# Patient Record
Sex: MALE | Race: WHITE | Employment: FULL TIME | URBAN - METROPOLITAN AREA
[De-identification: names, ages, dates, MRNs, and addresses within clinical notes are randomized per-mention and may not be internally consistent; named-entity substitution may affect disease eponyms.]

---

## 2021-02-16 ENCOUNTER — VIRTUAL VISIT (OUTPATIENT)
Dept: ENDOCRINOLOGY | Age: 26
End: 2021-02-16
Payer: COMMERCIAL

## 2021-02-16 DIAGNOSIS — E10.65 HYPERGLYCEMIA DUE TO TYPE 1 DIABETES MELLITUS (HCC): Primary | ICD-10-CM

## 2021-02-16 PROCEDURE — 99204 OFFICE O/P NEW MOD 45 MIN: CPT | Performed by: INTERNAL MEDICINE

## 2021-02-16 RX ORDER — BLOOD-GLUCOSE SENSOR
EACH MISCELLANEOUS
Qty: 3 DEVICE | Refills: 11 | Status: SHIPPED | OUTPATIENT
Start: 2021-02-16 | End: 2021-03-23 | Stop reason: SDUPTHER

## 2021-02-16 RX ORDER — INSULIN ASPART 100 [IU]/ML
INJECTION, SOLUTION INTRAVENOUS; SUBCUTANEOUS
Qty: 10 ML | Refills: 5 | Status: SHIPPED | OUTPATIENT
Start: 2021-02-16 | End: 2022-05-04 | Stop reason: SDUPTHER

## 2021-02-16 RX ORDER — INSULIN GLARGINE 100 [IU]/ML
INJECTION, SOLUTION SUBCUTANEOUS
Qty: 15 ML | Refills: 3 | Status: SHIPPED | OUTPATIENT
Start: 2021-02-16 | End: 2022-05-04 | Stop reason: SDUPTHER

## 2021-02-16 RX ORDER — INSULIN ASPART 100 [IU]/ML
INJECTION, SOLUTION INTRAVENOUS; SUBCUTANEOUS
COMMUNITY
End: 2021-02-16 | Stop reason: SDUPTHER

## 2021-02-16 RX ORDER — BLOOD-GLUCOSE,RECEIVER,CONT
EACH MISCELLANEOUS
Qty: 1 EACH | Refills: 0 | Status: SHIPPED | OUTPATIENT
Start: 2021-02-16 | End: 2021-03-23 | Stop reason: SDUPTHER

## 2021-02-16 RX ORDER — BLOOD SUGAR DIAGNOSTIC
STRIP MISCELLANEOUS
COMMUNITY
Start: 2021-02-13 | End: 2021-02-16 | Stop reason: SDUPTHER

## 2021-02-16 RX ORDER — INSULIN GLARGINE 100 [IU]/ML
24 INJECTION, SOLUTION SUBCUTANEOUS
Qty: 1 VIAL | Status: CANCELLED | OUTPATIENT
Start: 2021-02-16

## 2021-02-16 RX ORDER — BLOOD-GLUCOSE TRANSMITTER
1 EACH MISCELLANEOUS
Qty: 3 DEVICE | Refills: 11 | Status: SHIPPED | OUTPATIENT
Start: 2021-02-16 | End: 2021-03-23 | Stop reason: SDUPTHER

## 2021-02-16 RX ORDER — INSULIN GLARGINE 100 [IU]/ML
24 INJECTION, SOLUTION SUBCUTANEOUS AT BEDTIME
COMMUNITY
End: 2021-02-16 | Stop reason: SDUPTHER

## 2021-02-16 RX ORDER — BLOOD SUGAR DIAGNOSTIC
STRIP MISCELLANEOUS
Qty: 250 STRIP | Refills: 5 | Status: SHIPPED | OUTPATIENT
Start: 2021-02-16 | End: 2022-05-18 | Stop reason: SDUPTHER

## 2021-02-16 RX ORDER — INSULIN ASPART 100 [IU]/ML
INJECTION, SOLUTION INTRAVENOUS; SUBCUTANEOUS
Qty: 15 ML | Refills: 4 | Status: SHIPPED | OUTPATIENT
Start: 2021-02-16 | End: 2022-05-18 | Stop reason: SDUPTHER

## 2021-02-16 NOTE — PROGRESS NOTES
Chief Complaint   Patient presents with    New Patient     doxy: [phone]    Diabetes    Other     [facility]       History of Present Illness: Ana M Lala is a 31 y.o. male with minimal past medical hx presenting in self referral for discussion related to medication management of DM1. 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 Has been off cgm since October. Has the same pump he has had since 2016, has not used it in many months. Is using basal bolus currently but finds that BG is labile with this, particularly following exercise    Previous tandem t-slim pump settings:  1.1U/hr- maybe reduced overnight   1:10  1U 40 >150    Using humalog flexpens, prefers novolog. 24 of basaglar currently.      Diabetes Mellitus Type     * Diagnosed (year): 03/2013 before turning 18   * Last Hb A1C: 8.4 most recently- increased from 7.5%- typically in the 8 range since starting school     - Current DM medications:    - Orals:    - Injectables: 24U basaglar, humalog SF 40 1:10 insulin to carb ratio     - Monitors glucose: 8 times a day   Examples (range):    - fasting:  mg/dL lower in the AM minimally 58   - pre-lunch: mg/dL higher 2-3 hours after breakfast    - pre-dinner:  mg/dL after lunch spike   - post-prandial:  mg/dL 483     - History of hypoglycemia: if he does it's early in the AM around 5004-4057 3-4x weekly    - Hospitalized for DM: none    Diabetes-related complications:    * Nephropathy: Microalbuminuria 2016   * Retinopathy:    * Neuropathy: none   * Autonomic dysfunction: none   * History of amputations or foot ulcers: none     Lifestyle:    24-hour diet history:    meals/day snacks/day   * Breakfast: Protein waffle with pb and protein shake    * Lunch: Salad with chicken and broccoli, apple, protein bar and turkey   * Dinner: chipoltle -bowl usually low carb   * Snacks:     * Desserts:    * Drinks: h20 or seltzer    2019QI  Exercise:  Goes to the gym at 0500- does crossfit workouts and strength - weightlifting- runs when it's nice out 5-6x weekly     Support and Resources:   - Visit with dietician in the last 2 years: has not    Past Medical History:   Diagnosis Date    DM (diabetes mellitus), type 1 ([facility] [address]        History reviewed. No pertinent surgical history. Current Outpatient Medications   Medication Sig    OneTouch Verio test strips strip     insulin aspart U-100 (NOVOLOG) 100 unit/mL injection by SubCUTAneous route.  insulin glargine (LANTUS) 100 unit/mL injection 24 Units by SubCUTAneous route At bedtime. Indications: type 1 diabetes mellitus    insulin aspart U-100 (NovoLOG Flexpen U-100 Insulin) 100 unit/mL (3 mL) inpn by SubCUTAneous route Before breakfast, lunch, and dinner. 1 unit for every 10 carbs  1 unit for every 40 point over 150. No current facility-administered medications for this visit.         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     Review of Systems:  - Constitutional Symptoms: no fevers, chills, weight loss  - Eyes: no blurry vision or double vision  - Cardiovascular: no chest pain or palpitations  - Respiratory: no cough or shortness of breath  - Gastrointestinal: no dysphagia or abdominal pain  - Musculoskeletal: no joint pains or weakness  - Integumentary: no rashes  - Psychiatric: no depression or anxiety  - Endocrine: no heat or cold intolerance, no polyuria or polydipsia    Physical Examination:  - GENERAL: NCAT, Appears well nourished   - EYES: EOMI, non-icteric, no proptosis   - Ear/Nose/Throat: NCAT, no visible inflammation or masses   - CARDIOVASCULAR: no cyanosis, no visible JVD   - RESPIRATORY: respiratory effort normal without any distress or labored breathing   - MUSCULOSKELETAL: Normal ROM of neck and upper extremities observed   - SKIN: No rash on face  - NEUROLOGIC:  No facial asymmetry (Cranial nerve 7 motor function), No gaze palsy   - PSYCHIATRIC: Normal affect, Normal insight and judgement       Data Reviewed: none available at this time      Assessment/Plan: 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 He does have a history of microalbuminuria. We discussed the benefits of the t-slim x2 control IQ settings, will move towards upgrading pump. Fasting hypOglycemia suggests a variable basal rate. 1. DM1 c/b nephropathy previously  -continue 24U lantus, I:C of 1:10, SF 40 for now  - MICROALBUMIN, UR, RAND W/ MICROALB/CREAT RATIO; Future  - METABOLIC PANEL, BASIC; Future  - LIPID PANEL; Future  - Dexcom G6  misc; Use as directed  Dispense: 1 Each; Refill: 0  - Dexcom G6 Sensor rodger; Use as directed every 10 days  Dispense: 3 Device; Refill: 11  - Dexcom G6 Transmitter rodger; 1 Each by Other route every ten (10) days. Use as directed  Dispense: 3 Device; Refill: 11  - HEMOGLOBIN A1C WITH EAG; Future    Return to care:March 23rd at 3:10      Michael Perez is a 31 y.o. male being evaluated by a Virtual Visit (video visit) encounter to address concerns as mentioned above. A caregiver was present when appropriate. Due to this being a TeleHealth encounter (During EAZXK-21 public health emergency), evaluation of the following organ systems was limited:     Vitals/Constitutional/EENT/Resp/CV/GI//MS/Neuro/Skin/Heme-Lymph-Imm. Pursuant to the emergency declaration under the [facility], [address] [place] [address] [facility] authority and the [employer] and [employer] Act, this Virtual Visit was conducted with patient's (and/or legal guardian's) consent, to reduce the risk of exposure to COVID-19 and provide necessary medical care.       Services were provided through a video synchronous discussion virtually to substitute for in-person encounter. Jesse Tesfaye MD  [facility]     An electronic signature was used to authenticate this note.

## 2021-03-19 ENCOUNTER — TELEPHONE (OUTPATIENT)
Dept: ENDOCRINOLOGY | Age: 26
End: 2021-03-19

## 2021-03-19 NOTE — TELEPHONE ENCOUNTER
----- Message from [address] [place] [address] sent at 3/18/2021  4:17 PM EDT -----  Regarding: Dr. Zapata Links telephone  Patient return call    Caller's first and last name and relationship (if not the patient):      Best contact number(s):[phone]      Whose call is being returned: nurse      Details to clarify the request: Stated his insulin and supplies need approved as soon as possible to be filled by [facility] as he is very low on all of his supplies and would like to be contacted tomorrow.       [address] [place] [address]

## 2021-03-19 NOTE — TELEPHONE ENCOUNTER
Called and left a message that we have not yet received an order from marc st[facility]. Pt was then given his provider's first and last name along with our fax number and requested to have [facility] to send the request to the above number. James Rivera

## 2021-03-23 ENCOUNTER — VIRTUAL VISIT (OUTPATIENT)
Dept: ENDOCRINOLOGY | Age: 26
End: 2021-03-23
Payer: COMMERCIAL

## 2021-03-23 DIAGNOSIS — E10.65 HYPERGLYCEMIA DUE TO TYPE 1 DIABETES MELLITUS (HCC): ICD-10-CM

## 2021-03-23 PROCEDURE — 99212 OFFICE O/P EST SF 10 MIN: CPT | Performed by: INTERNAL MEDICINE

## 2021-03-23 RX ORDER — BLOOD-GLUCOSE SENSOR
EACH MISCELLANEOUS
Qty: 3 DEVICE | Refills: 11 | Status: SHIPPED | OUTPATIENT
Start: 2021-03-23

## 2021-03-23 RX ORDER — BLOOD-GLUCOSE,RECEIVER,CONT
EACH MISCELLANEOUS
Qty: 1 EACH | Refills: 0 | Status: SHIPPED | OUTPATIENT
Start: 2021-03-23

## 2021-03-23 RX ORDER — BLOOD-GLUCOSE TRANSMITTER
1 EACH MISCELLANEOUS
Qty: 3 DEVICE | Refills: 11 | Status: SHIPPED | OUTPATIENT
Start: 2021-03-23

## 2021-03-23 NOTE — PROGRESS NOTES
Chief Complaint   Patient presents with    Diabetes     ara [email or web address]    Follow-up       History of Present Illness: Maggi Aleman is a 31 y.o. male with minimal past medical hx presenting in self referral for discussion related to medication management of DM1.    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 Has been off cgm since October. Has the same pump he has had since , has not used it in many months. Is using basal bolus currently but finds that BG is labile with this, particularly following exercise    Previous tandem t-slim pump settings:  1.1U/hr- maybe reduced overnight   1:10  1U 40 >150    Using humalog flexpens, prefers novolog. 24 of basaglar currently. 2021: Reached out to tandem- everything was fine on their end- reached out to [employer]- they took too long, rx . Needs new rx. Generally, BG is stable. Uses lantus before gym. Can go to bed at 200 and wake up low.      Diabetes Mellitus Type     * Diagnosed (year): 2013 before turning 18   * Last Hb A1C: 8.4 most recently- increased from 7.5%- typically in the 8 range since starting school     - Current DM medications:    - Orals:    - Injectables: 24U basaglar, humalog SF 40 1:10 insulin to carb ratio     - Monitors glucose: 8 times a day   Examples (range):    - fasting:  mg/dL lower in the AM minimally 58   - pre-lunch: mg/dL higher 2-3 hours after breakfast    - pre-dinner:  mg/dL after lunch spike   - post-prandial:  mg/dL 483 max    - History of hypoglycemia: if he does it's early in the AM around 3555-9937 3-4x weekly    - Hospitalized for DM: none    Diabetes-related complications:    * Nephropathy: Microalbuminuria 2016   * Retinopathy: Referral placed   * Neuropathy: none   * Autonomic dysfunction: none   * History of amputations or foot ulcers: none     Lifestyle:    24-hour diet history:    meals/day snacks/day   * Breakfast: Protein waffle with pb and protein shake    * Lunch: Salad with chicken and broccoli, apple, protein bar and turkey   * Dinner: chipoltle -bowl usually low carb   * Snacks:     * Desserts:    * Drinks: h20 or seltzer    2019QI  Exercise:  Goes to the gym at 0500- does crossfit workouts and strength - weightlifting- runs when it's nice out 5-6x weekly     Support and Resources:   - Visit with dietician in the last 2 years: has not    Current Outpatient Medications   Medication Sig    OneTouch Verio test strips strip Use to check blood glucose 8x daily. Dx code e10.9    insulin glargine (LANTUS,BASAGLAR) 100 unit/mL (3 mL) inpn Inject 24 units subcutaneously daily when not using insulin pump  Indications: type 1 diabetes mellitus    insulin aspart U-100 (NovoLOG U-100 Insulin aspart) 100 unit/mL injection Use with insulin pump. Maximum daily dose 80 units. Indications: type 1 diabetes mellitus    insulin aspart U-100 (NovoLOG Flexpen U-100 Insulin) 100 unit/mL (3 mL) inpn 1 unit for every 10 carbs  1 unit for every 40 point over 150. Max daily dose 55 units. Indications: type 1 diabetes mellitus    Dexcom G6  misc Use as directed    Dexcom G6 Sensor rodger Use as directed every 10 days    Dexcom G6 Transmitter rodger 1 Each by Other route every ten (10) days. Use as directed     No current facility-administered medications for this visit.         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     Review of Systems:  - Constitutional Symptoms: no fevers, chills, weight loss  - Eyes: no blurry vision or double vision  - Cardiovascular: no chest pain or palpitations  - Respiratory: no cough or shortness of breath  - Gastrointestinal: no dysphagia or abdominal pain  - Musculoskeletal: no joint pains or weakness  - Integumentary: no rashes  - Psychiatric: no depression or anxiety  - Endocrine: no heat or cold intolerance, no polyuria or polydipsia    Physical Examination:  - GENERAL: NCAT, Appears well nourished   - EYES: EOMI, non-icteric, no proptosis   - Ear/Nose/Throat: NCAT, no visible inflammation or masses   - CARDIOVASCULAR: no cyanosis, no visible JVD   - RESPIRATORY: respiratory effort normal without any distress or labored breathing   - MUSCULOSKELETAL: Normal ROM of neck and upper extremities observed   - SKIN: No rash on face  - NEUROLOGIC:  No facial asymmetry (Cranial nerve 7 motor function), No gaze palsy   - PSYCHIATRIC: Normal affect, Normal insight and judgement       Data Reviewed: pending      Assessment/Plan: 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 He does have a history of microalbuminuria. We discussed the benefits of the t-slim x2 control IQ settings, will move towards upgrading pump. Fasting hypOglycemia suggests a variable basal rate. 1. DM1 c/b nephropathy previously  -continue 24U lantus, I:C of 1:10, SF 40 for now  - MICROALBUMIN, UR, RAND W/ MICROALB/CREAT RATIO; Future  - METABOLIC PANEL, BASIC; Future  - LIPID PANEL; Future  - Dexcom G6  misc; Use as directed  Dispense: 1 Each; Refill: 0  - Dexcom G6 Sensor rodger; Use as directed every 10 days  Dispense: 3 Device; Refill: 11  - Dexcom G6 Transmitter rodger; 1 Each by Other route every ten (10) days. Use as directed  Dispense: 3 Device; Refill: 11  - HEMOGLOBIN A1C WITH EAG; Future  - REFERRAL TO DIABETIC EDUCATION; Standing  - REFERRAL TO OPHTHALMOLOGY    Return to care following pump start. Robert Alas is a 31 y.o. male being evaluated by a Virtual Visit (video visit) encounter to address concerns as mentioned above. A caregiver was present when appropriate.  Due to this being a TeleHealth encounter (During IFZSP-85 public health emergency), evaluation of the following organ systems was limited:     Vitals/Constitutional/EENT/Resp/CV/GI//MS/Neuro/Skin/Heme-Lymph-Imm. Pursuant to the emergency declaration under the [address] [place] [address], [address] [place] [address] authority and the [employer] and [employer] Act, this Virtual Visit was conducted with patient's (and/or legal guardian's) consent, to reduce the risk of exposure to COVID-19 and provide necessary medical care. Services were provided through a video synchronous discussion virtually to substitute for in-person encounter. Lyudmila Gill MD  [facility]     An electronic signature was used to authenticate this note.

## 2021-03-24 NOTE — TELEPHONE ENCOUNTER
----- Message from [place] Matos sent at 3/24/2021  8:32 AM EDT -----  Regarding: Dr Alie Bowman Message/Vendor Calls    Caller's first and last name: Rey Grimes with [facility]      Reason for call: Rey Grimes would like to verify whether Dr Edmond Hamilton will be signing off on the medical request for pts infusions      Callback required yes/no and why: yes, to verify whether Dr Edmond Hamilton will be signing off on the medical request for pts infusions      Best contact number(s): [phone] reference #[id number]      Details to clarify the request: Rey Grimes would like to verify whether Dr Edmond Hamilton will be signing off on the medical request for pts infusions      Cuba Matos

## 2021-03-24 NOTE — TELEPHONE ENCOUNTER
Spoke with Timur Marvin at [facility] to make her aware the CGM orders were received and will be faxed back before the end of the day.

## 2021-04-13 ENCOUNTER — VIRTUAL VISIT (OUTPATIENT)
Dept: DIABETES SERVICES | Age: 26
End: 2021-04-13
Payer: COMMERCIAL

## 2021-04-13 DIAGNOSIS — E10.65 HYPERGLYCEMIA DUE TO TYPE 1 DIABETES MELLITUS (HCC): ICD-10-CM

## 2021-04-13 PROCEDURE — G0108 DIAB MANAGE TRN  PER INDIV: HCPCS

## 2021-04-13 NOTE — PROGRESS NOTES
[employer] [facility]  Diabetes Self-Management Education & Support Program  Encounter note    SUMMARY  Diabetes self-care management training was completed related to Monitoring, Physical activity, Taking medications and Problem solving. The participant will return on May 25, 2021 to continue DSMES related to insulin pump therapy and training. EVALUATION:  Discussed and addressed patient's questions regarding his pending shipment of his Tandem t:slim x2 system. He reports jaquan  RECOMMENDATIONS:  1) start your Dexcom G6 sensor prior to your pump start and be in active session. 2) will address setting midday basal adjustment per your experiences. 3) advised to start reviewing carb counting and meals that you are having problems estimating carbs for review with self at f/u visit. Considerations could be non starchy vegetables and adjusting for dietary fiber. May also consider dosing for protein if significant. May needs bolus extension. 4) discussed decreases to hypoglycemia treatment in the future with Control IQ will be needed to avoid system corrections. 5) will address nutrition and carb counting post pump start. Currently due to Jose A's schedule, pump training is scheduled for May 25th, 2021. DATE DSMES TOPIC EVALUATION     4/13/2021 HOW CAN BLOOD GLUCOSE MONITORING HELP ME?   a. Value of blood glucose monitoring   b. Realistic expectations   c. Differences between sensor and blood glucose values. d. Setting alerts on sensor for high/low/out of range  e. Using sensor glucose levels for treatment decisions. f. Using glucometer for treatment decisions   g. Use of sensor trend arrows when dosing insulin   h. Sensor sites on body and relative to infusion sets  i. Tips for improving adhesion    j. Downloading and using smartphone apps  k.  Data sharing with provider        Eros Momin is knowledgeable with regards to Dexcom G6 - we did discuss using the trend arrows while on injections to consider predictive dosing. He is currently self monitoring using glucometer until his supplies are shipped to restart his CGM. Advised him that when ready to start his pump to have an active sensor session is best.  Encouraged having Dexcom G6 and Clarity apps downloaded to smartphone. DATE DSMES TOPIC EVALUATION     4/13/2021 HOW DO MY DIABETES MEDICATIONS WORK?   a. Type 1 medications & methods    Insulin injections   c. Hypoglycemia symptoms & treatment    Glucagon emergency kits   d. General guidance regarding insulin use whether Type 1, 2 or gestational diabetes    Storage of insulin    Disposal     Traveling with medications   e. Barriers to medication adherence      The participant    Can describe the expected action & side effects of prescribed diabetes medications Yes.  Can demonstrate injection technique (if applicable) Yes. Gabi Mittal shared that he has used t:slimx2- familiar with system, reservoir changes and infusion sets. Has only been in injections for ~2-3 months and awaiting delivery of new system. Reviewed Control IQ - target, correction bolus and basal adjustments, insulin duration changes and 60% delivered from bolus calculator. DATE DSMES TOPIC EVALUATION     4/13/2021 HOW DOES PHYSICAL ACTIVITY AFFECT MY DIABETES?   a. Benefits of physical activity   b. Beginning a program of physical activity    Walking    Pedometers    Goal setting   c. Structured physical activity program    Aerobic activity    Resistance    Flexibility    Balance   d. Physical activity program progression   e. Safety issues   f. Barriers to physical activity   g. Facilitators of physical activity    Gabi Mittal shared that he is exercising usually for 60 minutes at anytime: gym/strength training, crossfit or soccer. Shared that for no reason BG values after activity could be upper 100-low 200 mg/dl but as high as 300 mg/dl after soccer.   We discussed how and why - reviewed Exercise activity with the new tandem system along with algorithm target 150 mg/dl and will have to tailor duration s/p activity to return to Control IQ. Discussed options for helping sensor adhesion with exercise for 10 day wear. Chandana ROMEO on 4/13/2021 at 3:27 PM    SYSFE-26 Public Health Emergency Adaptations for Telehealth:    Minerva Negron is a 31 y.o. male being evaluated through a synchronous (real-time) audio-video technology platform, as a substitution for an in-person encounter, to address the healthcare issues mentioned above. I was in the office. The patient was at home. A caregiver was present when appropriate. The patient and/or his healthcare decision maker, is aware that this patient-initiated, Telehealth encounter on 4/13/2021 is a billable service, with coverage as determined by his insurance carrier. He is aware that he may receive a bill and has provided verbal consent to proceed: Yes. This telehealth encounter occurred during the COVID-19 pandemic and public health emergency. Evaluation of the following organ systems was limited: Vitals/Constitutional/EENT/Resp/CV/GI//MS/Neuro/Skin/Heme-Lymph-Imm. Pursuant to the emergency declaration under the [facility], [address] [place] [address] [facility] authority and the [employer] and [employer] Act, this Virtual Visit was conducted with patient's (and/or legal guardian's) consent, to reduce the risk of exposure to COVID-19 and provide necessary medical care.      Time in appointment: 60 minutes

## 2021-05-25 ENCOUNTER — OFFICE VISIT (OUTPATIENT)
Dept: DIABETES SERVICES | Age: 26
End: 2021-05-25
Payer: COMMERCIAL

## 2021-05-25 DIAGNOSIS — E10.65 HYPERGLYCEMIA DUE TO TYPE 1 DIABETES MELLITUS (HCC): Primary | ICD-10-CM

## 2021-05-25 PROCEDURE — G0108 DIAB MANAGE TRN  PER INDIV: HCPCS | Performed by: DIETITIAN, REGISTERED

## 2021-05-25 NOTE — PROGRESS NOTES
[address] [place] [address] for Diabetes Health  Diabetes Self-Management Education & Support Program  Encounter note    SUMMARY  Diabetes self-care management training was completed related to insulin pump training. The participant will return on June 22 to continue DSMES related to problem solving and reviewing insulin pump data. EVALUATION:  Berry Logan is well versed in the basics of the Tandem t:slimx2 pump - he is new sensor integration along with mobile applications. Completed set change with me today - would consider updating fill amount once TDD has been establish with Control IQ working. Following systems were working on his system: Dexcom sensor paired with pump and smart phone loren, Bluetooth connection with t:connect loren, Control IQ on and active. Berry Logan has very good and insightful questions about his system, discussed target BG ranges with different activity programs. We did program sleep schedule for Sunday-Thursday with Friday and Saturday as unscheduled for him to program.     RECOMMENDATIONS:  1) consider using the bolus extension feature on your bolus menu for high fat, protein meals. 2) program all carbs into the insulin pump. 3) with low BG values, review your SG trend arrow first to assess if treatment is working as this may change direction before your SG value increases and remember to check for the control IQ icon to turn red and red zero status icon. 4) set Exercise activity 30 minutes prior and may need to run 30 minutes post activity. 5) pay attention with ETOH consumption - Control IQ may need to run without sleep activity? Could be trial and error or may want to turn off control IQ. 6) suspend \"stop insulin\" if you are disconnecting from your pump to maintain most accurate IOB estimation. Next provider visit is scheduled for pending. DATE DSMES TOPIC EVALUATION     5/25/2021 HOW CAN BLOOD GLUCOSE MONITORING HELP ME?   a. Value of blood glucose monitoring   b.  Realistic expectations   c. Differences between sensor and blood glucose values. d. Setting alerts on sensor for high/low/out of range  e. Using sensor glucose levels for treatment decisions. f. Using glucometer for treatment decisions   g. Use of sensor trend arrows when dosing insulin   h. Sensor sites on body and relative to infusion sets  i. Tips for improving adhesion    j. Downloading and using smartphone apps  k. Data sharing with provider        The participant    Can demonstrate their sensor/glucometer procedure Yes   Logs their BG readings Yes - has Dexcom and t:connect mobile apps     The participant and I reviewed sensor updates, trend arrows and times to SB with meter for making treatment decisions. Discussed how to use Skin Tac if needed for adhesion w/ activity and consider using overtape only if adhesive pad edge has started to lift. Reviewed barrier options if needed due to reaction. DATE DSMES TOPIC EVALUATION     5/25/2021 HOW DO MY DIABETES MEDICATIONS WORK?   a. Type 1 medications & methods    Insulin injections    Injection sites   b. Type 2 medications    Oral agents    GLP-1 agonists   c. Hypoglycemia symptoms & treatment    Glucagon emergency kits   d. General guidance regarding insulin use whether Type 1, 2 or gestational diabetes    Storage of insulin    Disposal     Traveling with medications   e. Barriers to medication adherence    Pt completed insulin pump training per Tandem guidelines for their t:slimx2 with Control IQ . Pt has been using the t:slim pump x2 pump without basal or control IQ functions. Pt is using [employer]  infusion set with 6mm cannula with a fill amount:  0.3 units.  Settings program today are as follows for personal profiles:    Timed Settings Basal   unit/hour Correction Factor   1 unit: mg/dl Carb Ratio   1 unit: gram Target BG  1 unit: mg/dl   12:00 am 1.0  40 mg/dl  12 g  150 mg/dl   12:00 pm 1.1  40 mg/dl     12 g  150 mg/dl    3:00 pm 1.0  40 mg/dl 12 g   150 mg/dl   6:00 pm 1.1  40 mg/dl     12 g  150 mg/dl     CGM Settings are as follows:  High Alert/ Repeat Alert: 200 mg/dl with 2 hours repeat  Low Alert/Repeat Alert: 80 mg/dl with 15 minute repeat  Max basal: 3 units/hour, Max bolus: 25 units. Currently, we have no data for establishing if any of his recent settings need changes. He has transcribe these from his older pump and this was used to develop his injection regimen. Miri Garcia RD on 5/25/2021 at 2:05 PM    Pt was seen in person for pump upgrade today.   Time in appointment: 90 minutes

## 2022-05-04 RX ORDER — INSULIN ASPART 100 [IU]/ML
INJECTION, SOLUTION INTRAVENOUS; SUBCUTANEOUS
Qty: 10 ML | Refills: 0 | Status: SHIPPED | OUTPATIENT
Start: 2022-05-04 | End: 2022-05-18 | Stop reason: SDUPTHER

## 2022-05-04 RX ORDER — INSULIN GLARGINE 100 [IU]/ML
INJECTION, SOLUTION SUBCUTANEOUS
Qty: 15 ML | Refills: 0 | Status: SHIPPED | OUTPATIENT
Start: 2022-05-04

## 2022-05-04 NOTE — TELEPHONE ENCOUNTER
2022  3:31 PM    Pt called and stated he needs a new prescription for the lantus, novolog and test strips. The pharmacy adv him his prescriptions has  and to give the provider office a call. Pt would like the amount change for his insulin. Pt can be reached at [phone].     Alexandria Guerrier

## 2022-05-17 ENCOUNTER — DOCUMENTATION ONLY (OUTPATIENT)
Dept: ENDOCRINOLOGY | Age: 27
End: 2022-05-17

## 2022-05-17 DIAGNOSIS — E04.1 THYROID NODULE: Primary | ICD-10-CM

## 2022-05-17 NOTE — PROGRESS NOTES
Received a call from [address] [place] [address] that pt has a swelling in the neck, spoke with pt and provider. Asked her to draw a TSH, I have ordered ultrasound.   Cancelled last visit, needs fu.    Niurka Valentine 346 Diabetes & Endocrinology

## 2022-05-18 ENCOUNTER — TELEPHONE (OUTPATIENT)
Dept: ENDOCRINOLOGY | Age: 27
End: 2022-05-18

## 2022-05-18 RX ORDER — INSULIN ASPART 100 [IU]/ML
INJECTION, SOLUTION INTRAVENOUS; SUBCUTANEOUS
Qty: 15 ML | Refills: 0 | Status: SHIPPED | OUTPATIENT
Start: 2022-05-18 | End: 2022-05-23 | Stop reason: SDUPTHER

## 2022-05-18 RX ORDER — BLOOD SUGAR DIAGNOSTIC
STRIP MISCELLANEOUS
Qty: 250 STRIP | Refills: 5 | Status: SHIPPED | OUTPATIENT
Start: 2022-05-18

## 2022-05-18 RX ORDER — INSULIN ASPART 100 [IU]/ML
INJECTION, SOLUTION INTRAVENOUS; SUBCUTANEOUS
Qty: 10 ML | Refills: 1 | Status: SHIPPED | OUTPATIENT
Start: 2022-05-18 | End: 2022-05-20 | Stop reason: SDUPTHER

## 2022-05-18 NOTE — TELEPHONE ENCOUNTER
Spoke with pt and he stated that insulin vials are not lasting throughout the month. Pt would like to know if additional vials can be sent in. He then requested to have his rx sent to the [facility] on [facility]. Pt was made aware the if will be using another [facility], they will be able to transfer his rx to the preferred pharmacy.

## 2022-05-18 NOTE — TELEPHONE ENCOUNTER
I want to see him after the ultrasound is done. Please ask him to schedule that appointment.     Niurka Evans 346 Diabetes & Endocrinology

## 2022-05-18 NOTE — TELEPHONE ENCOUNTER
Pt called 5/18 @ 10:36am. Pt stated he made an appointment for the thyroid ultrasound for 5/25. He was not sure if dr Bruno Davis want to see him prior to getting this scan done or not and would like to clarify with the nurse or dr Bruno Davis. Pt also stated he was supposed to start the novalog flexpen but his pharmacy has not received that and was wondering if there was something he needed to do in order to get that. He also stated he is running out of the novalog faster than he is getting it. He would like to discuss these medication questions as well.     Pt# [phone]

## 2022-05-20 RX ORDER — INSULIN ASPART 100 [IU]/ML
INJECTION, SOLUTION INTRAVENOUS; SUBCUTANEOUS
Qty: 60 ML | Refills: 0 | Status: SHIPPED | OUTPATIENT
Start: 2022-05-20 | End: 2022-05-23 | Stop reason: SDUPTHER

## 2022-05-20 RX ORDER — IBUPROFEN 200 MG
CAPSULE ORAL
Qty: 100 STRIP | Refills: 5 | Status: SHIPPED | OUTPATIENT
Start: 2022-05-20

## 2022-05-20 RX ORDER — INSULIN PUMP SYRINGE, 3 ML
EACH MISCELLANEOUS
Qty: 1 KIT | Refills: 0 | Status: SHIPPED | OUTPATIENT
Start: 2022-05-20

## 2022-05-20 NOTE — TELEPHONE ENCOUNTER
Pt state  that hector only received one vial if his insulin which may only last 1 week.       Please send a new rx

## 2022-05-23 ENCOUNTER — TELEPHONE (OUTPATIENT)
Dept: ENDOCRINOLOGY | Age: 27
End: 2022-05-23

## 2022-05-23 RX ORDER — INSULIN ASPART 100 [IU]/ML
INJECTION, SOLUTION INTRAVENOUS; SUBCUTANEOUS
Qty: 60 ML | Refills: 0 | Status: SHIPPED | OUTPATIENT
Start: 2022-05-23 | End: 2022-07-01

## 2022-05-23 RX ORDER — INSULIN ASPART 100 [IU]/ML
INJECTION, SOLUTION INTRAVENOUS; SUBCUTANEOUS
Qty: 15 ML | Refills: 0 | Status: SHIPPED | OUTPATIENT
Start: 2022-05-23 | End: 2022-07-01

## 2022-05-23 NOTE — TELEPHONE ENCOUNTER
Aspart insulin is not covered by pt's plan. Please send in a new rx for  Admelog, Humalog or Novolog brand name.

## 2022-05-25 ENCOUNTER — HOSPITAL ENCOUNTER (OUTPATIENT)
Dept: ULTRASOUND IMAGING | Age: 27
Discharge: HOME | End: 2022-05-25
Attending: INTERNAL MEDICINE
Payer: MEDICAID

## 2022-05-25 ENCOUNTER — DOCUMENTATION ONLY (OUTPATIENT)
Dept: ENDOCRINOLOGY | Age: 27
End: 2022-05-25

## 2022-05-25 DIAGNOSIS — E04.1 THYROID NODULE, COLD: Primary | ICD-10-CM

## 2022-05-25 DIAGNOSIS — E04.1 THYROID NODULE: ICD-10-CM

## 2022-05-25 PROCEDURE — 76536 US EXAM OF HEAD AND NECK: CPT

## 2022-05-25 NOTE — PROGRESS NOTES
Eboni Ward,    I'm ordering a fine needle aspiration of your thyroid nodule. It meets criteria for sampling by virtue of its size. You will be called to schedule this. It is done with lidocaine and you can drive home. Please schedule a follow up appointment with me.     Dr. Ej Mtz

## 2022-05-26 ENCOUNTER — TELEPHONE (OUTPATIENT)
Dept: ENDOCRINOLOGY | Age: 27
End: 2022-05-26

## 2022-05-26 NOTE — TELEPHONE ENCOUNTER
5/26/2022  9:30 AM    Sadia Ramsay called to see if dr. Taylor Bautista received the results from his tests from yesterday.  Please call her back at [phone]

## 2022-05-26 NOTE — TELEPHONE ENCOUNTER
Spoke with Tabitha Holman and she wanted to know whether or not pt's thyroid ultrasound results were received on yesterday. Tabitha Holman was then made aware that the pt's results were received.

## 2022-06-02 ENCOUNTER — TELEPHONE (OUTPATIENT)
Dept: ENDOCRINOLOGY | Age: 27
End: 2022-06-02

## 2022-06-02 NOTE — TELEPHONE ENCOUNTER
Spoke with pt and he stated that the message that was taken was incorrect and that he called  to find out whether or not a new rx was sent to his pharmacy for an increase dose of his insulin. Pt was then told that a new rx was sent on May 23 for a quantity of 60 ml of the Novolog vials. Pt then voiced understanding to what was stated.

## 2022-06-02 NOTE — TELEPHONE ENCOUNTER
Pt called 6/2 @ 1:16pm. Pt wanted to know if his insulin was supposed to be increased due to his lab results. If so then he would need a new rx stating the increase so he does not run out. He is almost out so a new order would need to be sent to his pharmacy [id number].     PT# [phone]

## 2022-06-03 ENCOUNTER — HOSPITAL ENCOUNTER (OUTPATIENT)
Dept: ULTRASOUND IMAGING | Age: 27
Discharge: HOME | End: 2022-06-03
Attending: INTERNAL MEDICINE
Payer: MEDICAID

## 2022-06-03 DIAGNOSIS — E04.1 THYROID NODULE, COLD: ICD-10-CM

## 2022-06-03 PROCEDURE — 88173 CYTOPATH EVAL FNA REPORT: CPT

## 2022-06-03 PROCEDURE — 10005 FNA BX W/US GDN 1ST LES: CPT

## 2022-06-03 PROCEDURE — 88172 CYTP DX EVAL FNA 1ST EA SITE: CPT

## 2022-06-03 PROCEDURE — 74011000250 HC RX REV CODE- 250: Performed by: NURSE PRACTITIONER

## 2022-06-03 RX ORDER — LIDOCAINE HYDROCHLORIDE 10 MG/ML
5 INJECTION, SOLUTION EPIDURAL; INFILTRATION; INTRACAUDAL; PERINEURAL
Status: COMPLETED | OUTPATIENT
Start: 2022-06-03 | End: 2022-06-03

## 2022-06-03 RX ADMIN — LIDOCAINE HYDROCHLORIDE 5 ML: 10 INJECTION, SOLUTION EPIDURAL; INFILTRATION; INTRACAUDAL; PERINEURAL at 16:00

## 2022-06-08 ENCOUNTER — TELEPHONE (OUTPATIENT)
Dept: ENDOCRINOLOGY | Age: 27
End: 2022-06-08

## 2022-06-08 ENCOUNTER — DOCUMENTATION ONLY (OUTPATIENT)
Dept: ENDOCRINOLOGY | Age: 27
End: 2022-06-08

## 2022-06-08 NOTE — TELEPHONE ENCOUNTER
6/8/2022  11:54 AM        Pt called and stated the pharmacy told him they  Have not received the new prescription for his novolog. Pt stated he was supposed to be getting an increase in his dosage. Pt will be leaving [place] in 3 days and would like to  his prescription for novolog today or tomorrow if possible. Pt stated he have been trying to get this prescription for about 3  Weeks. Pt stated the insulin he have will not las him while he's out of town. Pt would also like to know his biopsy results. Pt pharmacy is the [facility] located at [facility]. Pharmacy#[phone]  [place]#[phone]      Thanks,  Kendra Delgado

## 2022-06-08 NOTE — TELEPHONE ENCOUNTER
6/8/2022  2:34 PM          Pt called at 2:15 pm was returning 's call. #[phone]      Thanks,  Kylee Walker

## 2022-06-08 NOTE — PROGRESS NOTES
Attempted to call pt and wife x2 to discuss results. Left vm both times, will send [employer] message.      Javad Asher, Niurka 346 Diabetes & Endocrinology

## 2022-06-08 NOTE — TELEPHONE ENCOUNTER
Disp Refills Start End    NovoLOG U-100 Insulin aspart 100 unit/mL injection 60 mL 0 5/23/2022     Sig: Use with insulin pump. Maximum daily dose 80 units.  Indications: type 1 diabetes mellitus    Sent to pharmacy as: NovoLOG U-100 Insulin aspart 100 unit/mL subcutaneous solution    Notes to Pharmacy: New quantity request. Please cancel recent  rx. E-Prescribing Status: Receipt confirmed by pharmacy (5/23/2022  2:04 PM EDT)      This rx was sent in on May 23. I spoke with the pharmacist and was made aware that his rx is ready pickup. Pt was called and was made aware of this.

## 2022-06-09 NOTE — TELEPHONE ENCOUNTER
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 as well- control unclear, missed last fu appt, previously on pump  Noted lump on 04/20, friends at dental school told him to wait a couple weeks and then have it assessed  Was called by student health MD 05/17  TSH ordered by student health provided, I have not received results as of 06/09  Us with L sided 2.9cm nodule:        Solid, isoechoic, appears well circumscribed with possible micro/macro calcs  Cytology with AUS  AFIRMA pending    A/P:  Brenda Escobar prefers to have at least L thomas-thyroidectomy  Reviewed fact that he would need lateral neck ultrasound pre op if [facility] returns abnormal  Discussed preference to salvage R thyroid if non-malignant due to RLN and parathyroid injury risk  He will reach out to Jameson Trujillo MD as 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

## 2022-06-14 DIAGNOSIS — C73 THYROID CANCER (HCC): Primary | ICD-10-CM

## 2022-06-14 NOTE — PROGRESS NOTES
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

## 2022-06-15 ENCOUNTER — HOSPITAL ENCOUNTER (OUTPATIENT)
Dept: ULTRASOUND IMAGING | Age: 27
Discharge: HOME | End: 2022-06-15
Attending: INTERNAL MEDICINE
Payer: MEDICAID

## 2022-06-15 DIAGNOSIS — C73 THYROID CANCER (HCC): ICD-10-CM

## 2022-06-15 PROCEDURE — 76536 US EXAM OF HEAD AND NECK: CPT

## 2022-06-16 ENCOUNTER — DOCUMENTATION ONLY (OUTPATIENT)
Dept: ENDOCRINOLOGY | Age: 27
End: 2022-06-16

## 2022-06-16 ENCOUNTER — TELEPHONE (OUTPATIENT)
Dept: ENDOCRINOLOGY | Age: 27
End: 2022-06-16

## 2022-06-16 NOTE — TELEPHONE ENCOUNTER
6/16/2022  2:00 PM      Pt called and left voice mail today at 12:44 pm.Pt stated he was returning a missed call to go over his ultra sound results. Pt stated he will be at lunch until 1:30 pm but if we called after that he will answer. MAGED#[phone]      Thanks,  Arvin Sever

## 2022-06-16 NOTE — PROGRESS NOTES
Spoke with Jameson Trujillo about getting Emaline Risser in before he moves. Looks like there are 3 abnormal lymph nodes. Spoke with colleague in [facility] who has recommended Vikash Chaudhry MD in [facility], gave him that # and asked him to call for appt as he will need I131.     Po Kim, Niurka 346 Diabetes & Endocrinology

## 2022-06-16 NOTE — TELEPHONE ENCOUNTER
Spoke with Lucía Watson.     Tammy Tinoco, [facility]

## 2022-07-01 RX ORDER — INSULIN LISPRO 100 [IU]/ML
INJECTION, SOLUTION INTRAVENOUS; SUBCUTANEOUS
Qty: 60 ML | Refills: 0 | Status: SHIPPED | OUTPATIENT
Start: 2022-07-01

## 2022-07-01 NOTE — TELEPHONE ENCOUNTER
Pt called and LVM 7/1 @ 12:23 pm. Pt stated he had a missed call in regards to his insulin authorization.     Pt# [phone]

## 2022-08-29 ENCOUNTER — DOCUMENTATION ONLY (OUTPATIENT)
Dept: ENDOCRINOLOGY | Age: 27
End: 2022-08-29

## 2022-10-10 ENCOUNTER — VIRTUAL VISIT (OUTPATIENT)
Dept: ENDOCRINOLOGY | Age: 27
End: 2022-10-10
Payer: MEDICAID

## 2022-10-10 DIAGNOSIS — C73 CANCER OF THYROID GLAND (HCC): Primary | ICD-10-CM

## 2022-10-10 DIAGNOSIS — E10.649 TYPE 1 DIABETES MELLITUS WITH HYPOGLYCEMIA AND WITHOUT COMA (HCC): ICD-10-CM

## 2022-10-10 PROCEDURE — 99214 OFFICE O/P EST MOD 30 MIN: CPT | Performed by: INTERNAL MEDICINE

## 2022-10-10 NOTE — PROGRESS NOTES
Chief Complaint   Patient presents with    Thyroid Problem    Follow-up       History of Present Illness: Alana Balderas is a 28 y.o. male with minimal past medical hx presenting in self referral for discussion related to medication management of DM1.    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 Has been off cgm since October. Has the same pump he has had since , has not used it in many months. Is using basal bolus currently but finds that BG is labile with this, particularly following exercise    Previous tandem t-slim pump settings:  1.1U/hr- maybe reduced overnight   1:10  1U 40 >150    Using humalog flexpens, prefers novolog. 24 of basaglar currently. 2021: Reached out to tandem- everything was fine on their end- reached out to [facility]- they took too long, rx . Needs new rx. Generally, BG is stable. Uses lantus before gym. Can go to bed at 200 and wake up low. 10/10/2022: Since our last diabetes visit was diagnosed with thyroid ca. Called me 2022 from [place] with rapidly growing lump in neck. Underwent ultrasound 2022. FNA done  with AUS, AFIRMA with SFM, BRAF p.V600E positive. Lateral neck ultrasound pre-op with:    Realtime sonographic imaging of the neck was performed bilaterally. Bilateral  enlarged lymph nodes are noted. There is a right level 1B lymph node measuring  2.4 x 1.5 x 0.5 cm. Enlarged left level 2 lymph nodes are noted, one measuring  3.0 x 0.8 x 1.0 cm and the second measuring 1.8 x 1.8 x 0.8 cm. IMPRESSION  Bilateral enlarged cervical lymph nodes. Karla Membreno MD performed L lymph node FNA pre-op  with cytology showing:    Comment:  The aspirate smears show predominately polymorphous lymphoid elements and scattered histiocytes, consistent with a benign, reactive lymph node. Scant salivary gland elements are present in the background.  No definitive evidence of metastatic carcinoma is seen. Please correlate with pending thyroglobulin wash out results. Underwent thomas-thyroidectomy 07/13/2022 with final path showing: Oct 26th will have completion. Okay with proceeding to have I131- would like to do that over [place] break. Is planning to conceive in 3 year with wife. Just started new job. Is using t-slim x2 with control IQ, turns off when seeing pts. Discussed at [facility] board which has recommended 30-50mCi I131.     Diabetes Mellitus Type     * Diagnosed (year): 03/2013 before turning 18   * Last Hb A1C: 8.4 most recently- increased from 7.5%- typically in the 8 range since starting school     - Current DM medications:    - Orals:    - Injectables: t-slim x2 with control IQ    - Monitors glucose: 8 times a day   Examples (range):    - fasting:  mg/dL lower in the AM minimally 58   - pre-lunch: mg/dL higher 2-3 hours after breakfast    - pre-dinner:  mg/dL after lunch spike   - post-prandial:  mg/dL 483 max    - History of hypoglycemia: if he does it's early in the AM around 5727-6010 3-4x weekly    - Hospitalized for DM: none    Diabetes-related complications:    * Nephropathy: Microalbuminuria 2016   * Retinopathy: Referral placed previously   * Neuropathy: none   * Autonomic dysfunction: none   * History of amputations or foot ulcers: none     Lifestyle:    24-hour diet history:    meals/day snacks/day   * Breakfast: Protein waffle with pb and protein shake    * Lunch: Salad with chicken and broccoli, apple, protein bar and turkey   * Dinner: chipoltle -bowl usually low carb   * Snacks:     * Desserts:    * Drinks: h20 or seltzer    2019QI  Exercise:  Goes to the gym at 0500- does crossfit workouts and strength - weightlifting- runs when it's nice out 5-6x weekly     Support and Resources:   - Visit with dietician in the last 2 years: has not    Current Outpatient Medications   Medication Sig    insulin lispro (HUMALOG) 100 unit/mL kwikpen Use with insulin pump Maximum daily dose 80 units Indications type 1 diabetes mellitus Humalog insulin brand name    Dexcom G6  misc Use as directed    Dexcom G6 Sensor rodger Use as directed every 10 days    Dexcom G6 Transmitter rodger 1 Each by Other route every ten (10) days. Use as directed    glucose blood VI test strips (blood glucose test) strip Use to check blood glucose 8x daily. Dx code e10.9 This rx is be used only as a backup for the Dexcom device., Normal,   Patient is requesting to have this rx sent to new pharmacy. Please cancel current rx for this medication. (Patient not taking: Reported on 10/10/2022)    Blood-Glucose Meter monitoring kit Use to check blood glucose 8x daily. Dx code e10.9 This rx is be used only as a backup for the Dexcom device Please cancel current rx for this medication. (Patient not taking: Reported on 10/10/2022)    OneTouch Verio test strips strip Use to check blood glucose 8x daily. Dx code e10.9  This rx is be used only as a backup for the [facility] device. (Patient not taking: Reported on 10/10/2022)    insulin glargine (LANTUS,BASAGLAR) 100 unit/mL (3 mL) inpn Inject 24 units subcutaneously daily when not using insulin pump  Indications: type 1 diabetes mellitus     No current facility-administered medications for this visit.        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!)  Graduate of [employer]     Review of Systems:  See HPI    Physical Examination:  - GENERAL: NCAT, Appears well nourished   - EYES: EOMI, non-icteric, no proptosis   - Ear/Nose/Throat: NCAT, no visible inflammation or masses   - CARDIOVASCULAR: no cyanosis, no visible JVD   - RESPIRATORY: respiratory effort normal without any distress or labored breathing   - MUSCULOSKELETAL: Normal ROM of neck and upper extremities observed   - SKIN: No rash on face  - NEUROLOGIC:  No facial asymmetry (Cranial nerve 7 motor function), No gaze palsy   - PSYCHIATRIC: Normal affect, Normal insight and judgement       Data Reviewed: no new    Assessment/Plan: This is a very 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 He does have a history of microalbuminuria. Previously spent majority of time discussing type 1 diabetes management until pt was diagnosed with thyroid ca Summer of 2022. Did discuss benefit of omnipod 5 system vs t-slim x2, he should move towards looking into that system in [place]. Does have frequent lows with control-IQ and has to turn it off when seeing pts. Otherwise, see details below regarding new thyroid ca diagnosis. Attempt to coordinate care with Marcio Garner MD in [facility].    1. DM1 c/b nephropathy previously  -currently on t-slim x2 with control IQ, did not discuss setting as of 10/10/2022  -long overdue for HbA1c, ophthalmology visit, microalbumin  -may benefit from omnipod 5 system  - HEMOGLOBIN A1C WITH EAG;  Future  - REFERRAL TO DIABETIC EDUCATION; Standing  - REFERRAL TO OPHTHALMOLOGY    2.[id number] papillary thyroid carcinoma, follicular variant   -2.3ON primary with capsular invasion, no angioinvasion or lymphatic invasion  -underwent thomas-thyroidectomy and isthmusectomy 07/13/2022 with Kaia Mcleod MD at [address] [place] [address] in [place], [place]  -will be undergoing completion thyroidectomy 10/26/2022  -discussed TSH suppression, thyroglobulin monitoring, lateral neck ultrasound monitoring  -discussed risks of I131, in particular to developing fetus and transiently re fertility, sialadenitis  -reviewed appropriate levothyroxine admin, in particular to separate from divalent cations by 4 hours- anticipate about 150mcg post-op, may need to push to 175mcg for suppression pending Tg  -[facility] tumor board is recommending 30-50i I131 for adjuvant treatment, fax referral to Junior Crain MD office, called 10/10 AM, no answer on their direct line- fax referral to [phone], provided pt with this info    Return to care as needed for TSH checks post-op before establishing with Azra Downing MD    Route to Azra Downing MD and Loulou Munoz MD    Zenobia Paredes is a 28 y.o. male being evaluated by a Virtual Visit (video visit) encounter to address concerns as mentioned above. A caregiver was present when appropriate. Due to this being a TeleHealth encounter (During [facility] public health emergency), evaluation of the following organ systems was limited:     Vitals/Constitutional/EENT/Resp/CV/GI//MS/Neuro/Skin/Heme-Lymph-Imm. Pursuant to the emergency declaration under the [facility], [address] [place] [address] authority and the [employer] Act, this Virtual Visit was conducted with patient's (and/or legal guardian's) consent, to reduce the risk of exposure to COVID-19 and provide necessary medical care. Services were provided through a video synchronous discussion virtually to substitute for in-person encounter. Adelaide Cope MD  [address] [place] [address] Diabetes & Endocrinology     An electronic signature was used to authenticate this note.

## 2022-10-12 DIAGNOSIS — C73 CANCER OF THYROID GLAND (HCC): Primary | ICD-10-CM

## 2022-10-12 NOTE — PROGRESS NOTES
Spoke with Aydee Grove for warm handoff 10/11/2022    Brenden Wilson, Niurka 346 Diabetes & Endocrinology

## 2022-10-28 RX ORDER — INSULIN ASPART 100 [IU]/ML
INJECTION, SOLUTION INTRAVENOUS; SUBCUTANEOUS
Qty: 30 ML | Refills: 1 | Status: SHIPPED | OUTPATIENT
Start: 2022-10-28

## 2022-10-31 ENCOUNTER — TELEPHONE (OUTPATIENT)
Dept: ENDOCRINOLOGY | Age: 27
End: 2022-10-31

## 2022-10-31 NOTE — TELEPHONE ENCOUNTER
----- Message from Mlaachi Zhu MD sent at 10/26/2022  1:01 PM EDT -----  Please ask lucía to read the LOFTYt message I sent him and if he doesn't then please send it in a letter, ty    Rosie Velazquez [place] 346 Diabetes & Endocrinology